# Patient Record
Sex: FEMALE | Race: WHITE | URBAN - METROPOLITAN AREA
[De-identification: names, ages, dates, MRNs, and addresses within clinical notes are randomized per-mention and may not be internally consistent; named-entity substitution may affect disease eponyms.]

---

## 2022-09-20 ENCOUNTER — LAB ENCOUNTER (OUTPATIENT)
Dept: LAB | Age: 22
End: 2022-09-20
Attending: FAMILY MEDICINE

## 2022-09-20 ENCOUNTER — OFFICE VISIT (OUTPATIENT)
Dept: FAMILY MEDICINE CLINIC | Facility: CLINIC | Age: 22
End: 2022-09-20

## 2022-09-20 VITALS
OXYGEN SATURATION: 98 % | TEMPERATURE: 98 F | DIASTOLIC BLOOD PRESSURE: 52 MMHG | RESPIRATION RATE: 16 BRPM | WEIGHT: 122 LBS | SYSTOLIC BLOOD PRESSURE: 96 MMHG | HEIGHT: 61.81 IN | HEART RATE: 84 BPM | BODY MASS INDEX: 22.45 KG/M2

## 2022-09-20 DIAGNOSIS — Z13.228 SCREENING FOR ENDOCRINE, NUTRITIONAL, METABOLIC AND IMMUNITY DISORDER: ICD-10-CM

## 2022-09-20 DIAGNOSIS — Z13.6 SCREENING FOR ISCHEMIC HEART DISEASE: ICD-10-CM

## 2022-09-20 DIAGNOSIS — Z00.00 ANNUAL PHYSICAL EXAM: Primary | ICD-10-CM

## 2022-09-20 DIAGNOSIS — Z13.29 SCREENING FOR ENDOCRINE, NUTRITIONAL, METABOLIC AND IMMUNITY DISORDER: ICD-10-CM

## 2022-09-20 DIAGNOSIS — Z13.0 SCREENING FOR ENDOCRINE, NUTRITIONAL, METABOLIC AND IMMUNITY DISORDER: ICD-10-CM

## 2022-09-20 DIAGNOSIS — L65.9 HAIR THINNING: ICD-10-CM

## 2022-09-20 DIAGNOSIS — Z12.4 CERVICAL CANCER SCREENING: ICD-10-CM

## 2022-09-20 DIAGNOSIS — Z13.21 SCREENING FOR ENDOCRINE, NUTRITIONAL, METABOLIC AND IMMUNITY DISORDER: ICD-10-CM

## 2022-09-20 PROBLEM — F12.90 MARIJUANA USE: Status: ACTIVE | Noted: 2020-02-03

## 2022-09-20 LAB
ALBUMIN SERPL-MCNC: 4 G/DL (ref 3.4–5)
ALBUMIN/GLOB SERPL: 1.3 {RATIO} (ref 1–2)
ALP LIVER SERPL-CCNC: 40 U/L
ALT SERPL-CCNC: 20 U/L
ANION GAP SERPL CALC-SCNC: 4 MMOL/L (ref 0–18)
AST SERPL-CCNC: 14 U/L (ref 15–37)
BASOPHILS # BLD AUTO: 0.04 X10(3) UL (ref 0–0.2)
BASOPHILS NFR BLD AUTO: 0.7 %
BILIRUB SERPL-MCNC: 0.4 MG/DL (ref 0.1–2)
BUN BLD-MCNC: 7 MG/DL (ref 7–18)
CALCIUM BLD-MCNC: 9 MG/DL (ref 8.5–10.1)
CHLORIDE SERPL-SCNC: 107 MMOL/L (ref 98–112)
CHOLEST SERPL-MCNC: 116 MG/DL (ref ?–200)
CO2 SERPL-SCNC: 27 MMOL/L (ref 21–32)
CREAT BLD-MCNC: 0.7 MG/DL
EOSINOPHIL # BLD AUTO: 0.11 X10(3) UL (ref 0–0.7)
EOSINOPHIL NFR BLD AUTO: 1.9 %
ERYTHROCYTE [DISTWIDTH] IN BLOOD BY AUTOMATED COUNT: 11.7 %
FASTING PATIENT LIPID ANSWER: YES
FASTING STATUS PATIENT QL REPORTED: YES
GFR SERPLBLD BASED ON 1.73 SQ M-ARVRAT: 125 ML/MIN/1.73M2 (ref 60–?)
GLOBULIN PLAS-MCNC: 3.1 G/DL (ref 2.8–4.4)
GLUCOSE BLD-MCNC: 86 MG/DL (ref 70–99)
HCT VFR BLD AUTO: 40.9 %
HDLC SERPL-MCNC: 45 MG/DL (ref 40–59)
HGB BLD-MCNC: 13.6 G/DL
IMM GRANULOCYTES # BLD AUTO: 0.01 X10(3) UL (ref 0–1)
IMM GRANULOCYTES NFR BLD: 0.2 %
LDLC SERPL CALC-MCNC: 58 MG/DL (ref ?–100)
LYMPHOCYTES # BLD AUTO: 2.05 X10(3) UL (ref 1–4)
LYMPHOCYTES NFR BLD AUTO: 35.7 %
MCH RBC QN AUTO: 28.7 PG (ref 26–34)
MCHC RBC AUTO-ENTMCNC: 33.3 G/DL (ref 31–37)
MCV RBC AUTO: 86.3 FL
MONOCYTES # BLD AUTO: 0.35 X10(3) UL (ref 0.1–1)
MONOCYTES NFR BLD AUTO: 6.1 %
NEUTROPHILS # BLD AUTO: 3.18 X10 (3) UL (ref 1.5–7.7)
NEUTROPHILS # BLD AUTO: 3.18 X10(3) UL (ref 1.5–7.7)
NEUTROPHILS NFR BLD AUTO: 55.4 %
NONHDLC SERPL-MCNC: 71 MG/DL (ref ?–130)
OSMOLALITY SERPL CALC.SUM OF ELEC: 283 MOSM/KG (ref 275–295)
PLATELET # BLD AUTO: 235 10(3)UL (ref 150–450)
POTASSIUM SERPL-SCNC: 3.9 MMOL/L (ref 3.5–5.1)
PROT SERPL-MCNC: 7.1 G/DL (ref 6.4–8.2)
RBC # BLD AUTO: 4.74 X10(6)UL
SODIUM SERPL-SCNC: 138 MMOL/L (ref 136–145)
TRIGL SERPL-MCNC: 60 MG/DL (ref 30–149)
TSI SER-ACNC: 1 MIU/ML (ref 0.36–3.74)
VLDLC SERPL CALC-MCNC: 9 MG/DL (ref 0–30)
WBC # BLD AUTO: 5.7 X10(3) UL (ref 4–11)

## 2022-09-20 PROCEDURE — 99385 PREV VISIT NEW AGE 18-39: CPT | Performed by: FAMILY MEDICINE

## 2022-09-20 PROCEDURE — 3074F SYST BP LT 130 MM HG: CPT | Performed by: FAMILY MEDICINE

## 2022-09-20 PROCEDURE — 80061 LIPID PANEL: CPT | Performed by: FAMILY MEDICINE

## 2022-09-20 PROCEDURE — 3078F DIAST BP <80 MM HG: CPT | Performed by: FAMILY MEDICINE

## 2022-09-20 PROCEDURE — 80050 GENERAL HEALTH PANEL: CPT | Performed by: FAMILY MEDICINE

## 2022-09-20 PROCEDURE — 3008F BODY MASS INDEX DOCD: CPT | Performed by: FAMILY MEDICINE

## 2022-09-23 ENCOUNTER — PATIENT MESSAGE (OUTPATIENT)
Dept: FAMILY MEDICINE CLINIC | Facility: CLINIC | Age: 22
End: 2022-09-23

## 2022-09-23 DIAGNOSIS — L65.9 HAIR THINNING: Primary | ICD-10-CM

## 2022-09-23 NOTE — TELEPHONE ENCOUNTER
From: Clayton Arnold  To: Milly Ledbetter MD  Sent: 9/23/2022 1:30 AM CDT  Subject: Derm Referral    Hi, glad to see that my bloodwork looks good. I saw that you can refer me to a derm for my hair concern and that would be great, as I have some skin concerns as well and have been meaning to go.  Thank you!!

## 2022-10-27 ENCOUNTER — LAB ENCOUNTER (OUTPATIENT)
Dept: LAB | Age: 22
End: 2022-10-27
Attending: STUDENT IN AN ORGANIZED HEALTH CARE EDUCATION/TRAINING PROGRAM
Payer: COMMERCIAL

## 2022-10-27 DIAGNOSIS — L65.9 ALOPECIA, UNSPECIFIED: Primary | ICD-10-CM

## 2022-10-27 LAB
DEPRECATED HBV CORE AB SER IA-ACNC: 21.6 NG/ML
VIT D+METAB SERPL-MCNC: 14.8 NG/ML (ref 30–100)

## 2022-10-27 PROCEDURE — 82306 VITAMIN D 25 HYDROXY: CPT

## 2022-10-27 PROCEDURE — 82728 ASSAY OF FERRITIN: CPT

## 2022-10-27 PROCEDURE — 86225 DNA ANTIBODY NATIVE: CPT

## 2022-10-27 PROCEDURE — 36415 COLL VENOUS BLD VENIPUNCTURE: CPT

## 2022-10-27 PROCEDURE — 84630 ASSAY OF ZINC: CPT

## 2022-10-27 PROCEDURE — 86038 ANTINUCLEAR ANTIBODIES: CPT

## 2022-10-29 LAB
DSDNA IGG SERPL IA-ACNC: 1 IU/ML
ENA AB SER QL IA: 0.3 UG/L
ENA AB SER QL IA: NEGATIVE

## 2022-10-30 LAB — ZINC SERUM: 70.8 UG/DL

## 2024-01-18 ENCOUNTER — OFFICE VISIT (OUTPATIENT)
Dept: FAMILY MEDICINE CLINIC | Facility: CLINIC | Age: 24
End: 2024-01-18
Payer: COMMERCIAL

## 2024-01-18 VITALS
BODY MASS INDEX: 24 KG/M2 | WEIGHT: 132.81 LBS | HEART RATE: 84 BPM | DIASTOLIC BLOOD PRESSURE: 62 MMHG | OXYGEN SATURATION: 98 % | TEMPERATURE: 97 F | SYSTOLIC BLOOD PRESSURE: 106 MMHG | RESPIRATION RATE: 18 BRPM

## 2024-01-18 DIAGNOSIS — G89.29 CHRONIC PELVIC PAIN IN FEMALE: Primary | ICD-10-CM

## 2024-01-18 DIAGNOSIS — Z23 NEED FOR VACCINATION: ICD-10-CM

## 2024-01-18 DIAGNOSIS — R10.2 CHRONIC PELVIC PAIN IN FEMALE: Primary | ICD-10-CM

## 2024-01-18 PROCEDURE — 99213 OFFICE O/P EST LOW 20 MIN: CPT | Performed by: FAMILY MEDICINE

## 2024-01-18 PROCEDURE — 3078F DIAST BP <80 MM HG: CPT | Performed by: FAMILY MEDICINE

## 2024-01-18 PROCEDURE — 3074F SYST BP LT 130 MM HG: CPT | Performed by: FAMILY MEDICINE

## 2024-01-19 NOTE — PROGRESS NOTES
CHIEF COMPLAINT:   Chief Complaint   Patient presents with    Pelvic Pain         HPI:     Alona Holt is a 23 year old female presents for pelvic pain.    Pelvic pain:  Pt has had a few months history of pelvic pain.  She first noticed it during intercourse.  She also feels the pain when she leans against a table while at work.  She has no cramping, no heavy bleeding with her periods, no painful periods.  No vaginal discharge and no urinary symptoms. She also feels the pain when weight-bearing during a BM. She pain is on both sides,.  She is not on any hormonal BC.              HISTORY:  No past medical history on file.   No past surgical history on file.   No family history on file.   Social History:   Social History     Socioeconomic History    Marital status: Single   Tobacco Use    Smoking status: Never    Smokeless tobacco: Never   Vaping Use    Vaping Use: Former    Substances: Nicotine    Devices: Disposable   Substance and Sexual Activity    Drug use: Never        Medications (Active prior to today's visit):  No current outpatient medications on file.       Allergies:  Allergies   Allergen Reactions    Fluoxetine OTHER (SEE COMMENTS)     Personality changes, started stealing      Doxycycline ITCHING, MYALGIA and PAIN     Body aches         PSFH elements reviewed from today and agreed except as otherwise stated in HPI.  ROS:     Review of Systems   Constitutional:  Negative for appetite change, chills, fatigue and fever.   Genitourinary:  Positive for menstrual problem and pelvic pain. Negative for dysuria, vaginal bleeding, vaginal discharge and vaginal pain.         Pertinent positives and negatives noted in the the HPI.    PHYSICAL EXAM:   /62 (BP Location: Left arm, Patient Position: Sitting, Cuff Size: adult)   Pulse 84   Temp 97.2 °F (36.2 °C) (Temporal)   Resp 18   Wt 132 lb 12.8 oz (60.2 kg)   LMP 01/09/2024   SpO2 98%   BMI 24.44 kg/m²   Vital signs reviewed.Appears stated  age, well groomed.  Physical Exam  Vitals reviewed.   Constitutional:       Appearance: Normal appearance.   HENT:      Head: Normocephalic.   Cardiovascular:      Rate and Rhythm: Normal rate and regular rhythm.      Pulses: Normal pulses.      Heart sounds: Normal heart sounds.   Pulmonary:      Effort: Pulmonary effort is normal. No respiratory distress.      Breath sounds: Normal breath sounds.   Abdominal:      General: Bowel sounds are normal. There is no distension.      Palpations: Abdomen is soft.      Tenderness: There is no abdominal tenderness. There is no guarding.   Genitourinary:     Comments: Deferred vaginal exam  Skin:     General: Skin is warm and dry.   Neurological:      Mental Status: She is alert.          LABS     No visits with results within 2 Month(s) from this visit.   Latest known visit with results is:   [facility] Lab Encounter on 10/27/2022   Component Date Value    Zinc 10/27/2022 70.8     Expanded MARK Antibody Sc* 10/27/2022 0.30     Anti-dsDNA antibody 10/27/2022 1.0     Connective Tissue Diseas* 10/27/2022 Negative     Ferritin 10/27/2022 21.6     Vitamin D, 25OH, Total 10/27/2022 14.8 (L)       REVIEWED THIS VISIT  ASSESSMENT/PLAN:   23 year old female with    1. Chronic pelvic pain in female  - pt is nervous about transvaginal US and prefers transabdominal US is completed instead  - recommend supportive care  - await results    - US PELVIS (TRANSABDOMINAL AND TRANSVAGINAL) (CPT=76856/98035); Future    2. Need for vaccination    - Influenza Refused      Meds This Visit:  Requested Prescriptions      No prescriptions requested or ordered in this encounter       Health Maintenance:  Health Maintenance   Topic Date Due    Pap Smear  Never done    COVID-19 Vaccine (3 - 2023-24 season) 09/01/2023    Annual Physical  09/20/2023    Influenza Vaccine (1) 01/18/2025 (Originally 10/1/2023)    DTaP,Tdap,and Td Vaccines (8 - Td or Tdap) 05/20/2032    Annual Depression Screening  Completed    HPV  Vaccines  Completed    Pneumococcal Vaccine: Birth to 64yrs  Aged Out         Patient/Caregiver Education: There are no barriers to learning. Medical education done.   Outcome: Patient verbalizes understanding and agrees with plan. Advised to call or RTC if symptoms persist or worsen.    Problem List:     Patient Active Problem List   Diagnosis    Marijuana use    VIOLA (generalized anxiety disorder)    MDD (major depressive disorder), severe (HCC)       Imaging & Referrals:  INFLUENZA REFUSED [facility]  US PELVIS (TRANSABDOMINAL AND TRANSVAGINAL) (CPT=76856/57000)     1/19/2024  Stephany Flower MD      Patient understands plan and follow-up.  Return for follow-up depending on lab results.

## 2024-01-23 ENCOUNTER — HOSPITAL ENCOUNTER (OUTPATIENT)
Dept: ULTRASOUND IMAGING | Facility: HOSPITAL | Age: 24
Discharge: HOME | End: 2024-01-23
Attending: FAMILY MEDICINE
Payer: COMMERCIAL

## 2024-01-23 DIAGNOSIS — G89.29 CHRONIC PELVIC PAIN IN FEMALE: ICD-10-CM

## 2024-01-23 DIAGNOSIS — R10.2 CHRONIC PELVIC PAIN IN FEMALE: ICD-10-CM

## 2024-01-23 PROCEDURE — 76856 US EXAM PELVIC COMPLETE: CPT | Performed by: FAMILY MEDICINE

## 2024-01-24 DIAGNOSIS — N83.201 CYST OF RIGHT OVARY: Primary | ICD-10-CM

## 2024-02-02 ENCOUNTER — PATIENT MESSAGE (OUTPATIENT)
Dept: FAMILY MEDICINE CLINIC | Facility: CLINIC | Age: 24
End: 2024-02-02

## 2024-02-02 DIAGNOSIS — G89.29 CHRONIC PELVIC PAIN IN FEMALE: Primary | ICD-10-CM

## 2024-02-02 DIAGNOSIS — R10.2 CHRONIC PELVIC PAIN IN FEMALE: Primary | ICD-10-CM

## 2024-02-12 NOTE — TELEPHONE ENCOUNTER
From: Alona Holt  To: Stephany Flower  Sent: 2/2/2024 9:33 PM CST  Subject: CT Scan    Hello,    I went to [facility] on 1/29 to find the next steps to treat my ovarian cyst because I’m not interested in hormonal birth control. I didn’t go to the place you referred because the appointments were too far in advance. I saw Dr. Mejia.  The OB I saw said that there wasn’t much she could do aside from hormonal birth control. She suggested that I request a CT scan through you because of the anatomy of the right side of the body & the pain I’m in as a precaution. She said it should work itself out with my cycle (but I’ve been experiencing worsening pain for months so I don’t understand) I feel like my pain has gotten worse in the past couple weeks as well. I feel throbbing pain as well as pain in my vagina. I’m using my heating pad & taking NSAIDs everyday and I don’t know what else to do. Do you suggest a CT scan, second opinion or anything else?    Thanks  Alona Holt

## 2024-02-12 NOTE — TELEPHONE ENCOUNTER
Please review pts MCM and advise.     A/p from ov on 1/19/24 and US from  1. Chronic pelvic pain in female  - pt is nervous about transvaginal US and prefers transabdominal US is completed instead  - recommend supportive care  - await results     - US PELVIS (TRANSABDOMINAL AND TRANSVAGINAL) (CPT=76856/54798); Future    Impression   CONCLUSION:  1. Normal size uterus.  Normal endometrium for age.  Small amount of free pelvic fluid.  Slightly complex cyst in the right ovary measuring 2.6 x 1.5 x 1.5 cm.  Normal left ovary.  No large adnexal mass.           Dictated by (CST): Jose Alfredo Russ MD on 1/23/2024 at 6:17 PM      Finalized by (CST): Jose Alfredo Russ MD on 1/23/2024 at 6:20 PM

## 2024-05-07 ENCOUNTER — TELEPHONE (OUTPATIENT)
Dept: FAMILY MEDICINE CLINIC | Facility: CLINIC | Age: 24
End: 2024-05-07

## 2024-05-07 NOTE — TELEPHONE ENCOUNTER
Called patient Lm on VM to call office and let us know if they are still interested in getting CT scan of abd/pelvis done. If not we can cancel the order. Phone number was provided

## 2024-07-26 ENCOUNTER — PATIENT MESSAGE (OUTPATIENT)
Dept: FAMILY MEDICINE CLINIC | Facility: CLINIC | Age: 24
End: 2024-07-26

## 2024-07-26 NOTE — TELEPHONE ENCOUNTER
From: Alona Holt  To: Stephany Flower  Sent: 7/26/2024 12:39 PM CDT  Subject: Adding Vision Test to Physical    Hello, can we please add a vision test to my upcoming physical on 8/1, if it isn’t provided already? Thank you

## 2024-07-29 RX ORDER — CIPROFLOXACIN AND DEXAMETHASONE 3; 1 MG/ML; MG/ML
SUSPENSION/ DROPS AURICULAR (OTIC)
COMMUNITY
Start: 2024-05-14 | End: 2024-08-01 | Stop reason: ALTCHOICE

## 2024-07-29 RX ORDER — CYCLOBENZAPRINE HCL 10 MG
TABLET ORAL
COMMUNITY
Start: 2024-05-03 | End: 2024-08-01 | Stop reason: ALTCHOICE

## 2024-07-29 RX ORDER — AMOXICILLIN AND CLAVULANATE POTASSIUM 875; 125 MG/1; MG/1
1 TABLET, FILM COATED ORAL 2 TIMES DAILY
COMMUNITY
Start: 2024-05-12 | End: 2024-08-01 | Stop reason: ALTCHOICE

## 2024-08-01 ENCOUNTER — OFFICE VISIT (OUTPATIENT)
Dept: FAMILY MEDICINE CLINIC | Facility: CLINIC | Age: 24
End: 2024-08-01
Payer: COMMERCIAL

## 2024-08-01 VITALS
OXYGEN SATURATION: 98 % | HEART RATE: 74 BPM | HEIGHT: 61.5 IN | RESPIRATION RATE: 18 BRPM | BODY MASS INDEX: 24.23 KG/M2 | TEMPERATURE: 98 F | WEIGHT: 130 LBS | SYSTOLIC BLOOD PRESSURE: 130 MMHG | DIASTOLIC BLOOD PRESSURE: 64 MMHG

## 2024-08-01 DIAGNOSIS — H53.8 BLURRY VISION: ICD-10-CM

## 2024-08-01 DIAGNOSIS — Z13.0 SCREENING FOR ENDOCRINE, NUTRITIONAL, METABOLIC AND IMMUNITY DISORDER: ICD-10-CM

## 2024-08-01 DIAGNOSIS — Z30.011 ENCOUNTER FOR INITIAL PRESCRIPTION OF CONTRACEPTIVE PILLS: ICD-10-CM

## 2024-08-01 DIAGNOSIS — Z13.228 SCREENING FOR ENDOCRINE, NUTRITIONAL, METABOLIC AND IMMUNITY DISORDER: ICD-10-CM

## 2024-08-01 DIAGNOSIS — Z13.6 SCREENING FOR ISCHEMIC HEART DISEASE: ICD-10-CM

## 2024-08-01 DIAGNOSIS — N83.201 RIGHT OVARIAN CYST: ICD-10-CM

## 2024-08-01 DIAGNOSIS — Z13.21 SCREENING FOR ENDOCRINE, NUTRITIONAL, METABOLIC AND IMMUNITY DISORDER: ICD-10-CM

## 2024-08-01 DIAGNOSIS — Z00.00 ANNUAL PHYSICAL EXAM: Primary | ICD-10-CM

## 2024-08-01 DIAGNOSIS — Z13.29 SCREENING FOR ENDOCRINE, NUTRITIONAL, METABOLIC AND IMMUNITY DISORDER: ICD-10-CM

## 2024-08-01 LAB
ALBUMIN SERPL-MCNC: 4.6 G/DL (ref 3.2–4.8)
ALBUMIN/GLOB SERPL: 1.9 {RATIO} (ref 1–2)
ALP LIVER SERPL-CCNC: 36 U/L
ALT SERPL-CCNC: 12 U/L
ANION GAP SERPL CALC-SCNC: 5 MMOL/L (ref 0–18)
AST SERPL-CCNC: 17 U/L (ref ?–34)
BASOPHILS # BLD AUTO: 0.03 X10(3) UL (ref 0–0.2)
BASOPHILS NFR BLD AUTO: 0.6 %
BILIRUB SERPL-MCNC: 0.4 MG/DL (ref 0.3–1.2)
BUN BLD-MCNC: 9 MG/DL (ref 9–23)
CALCIUM BLD-MCNC: 9.6 MG/DL (ref 8.7–10.4)
CHLORIDE SERPL-SCNC: 105 MMOL/L (ref 98–112)
CHOLEST SERPL-MCNC: 127 MG/DL (ref ?–200)
CO2 SERPL-SCNC: 28 MMOL/L (ref 21–32)
CONTROL LINE PRESENT WITH A CLEAR BACKGROUND (YES/NO): YES YES/NO
CREAT BLD-MCNC: 0.76 MG/DL
EGFRCR SERPLBLD CKD-EPI 2021: 112 ML/MIN/1.73M2 (ref 60–?)
EOSINOPHIL # BLD AUTO: 0.06 X10(3) UL (ref 0–0.7)
EOSINOPHIL NFR BLD AUTO: 1.1 %
ERYTHROCYTE [DISTWIDTH] IN BLOOD BY AUTOMATED COUNT: 12.1 %
FASTING PATIENT LIPID ANSWER: YES
FASTING STATUS PATIENT QL REPORTED: YES
GLOBULIN PLAS-MCNC: 2.4 G/DL (ref 2–3.5)
GLUCOSE BLD-MCNC: 74 MG/DL (ref 70–99)
HCT VFR BLD AUTO: 40.4 %
HDLC SERPL-MCNC: 45 MG/DL (ref 40–59)
HGB BLD-MCNC: 13.6 G/DL
IMM GRANULOCYTES # BLD AUTO: 0.01 X10(3) UL (ref 0–1)
IMM GRANULOCYTES NFR BLD: 0.2 %
KIT LOT #: NORMAL NUMERIC
LDLC SERPL CALC-MCNC: 70 MG/DL (ref ?–100)
LYMPHOCYTES # BLD AUTO: 1.94 X10(3) UL (ref 1–4)
LYMPHOCYTES NFR BLD AUTO: 36.2 %
MCH RBC QN AUTO: 29 PG (ref 26–34)
MCHC RBC AUTO-ENTMCNC: 33.7 G/DL (ref 31–37)
MCV RBC AUTO: 86.1 FL
MONOCYTES # BLD AUTO: 0.41 X10(3) UL (ref 0.1–1)
MONOCYTES NFR BLD AUTO: 7.6 %
NEUTROPHILS # BLD AUTO: 2.91 X10 (3) UL (ref 1.5–7.7)
NEUTROPHILS # BLD AUTO: 2.91 X10(3) UL (ref 1.5–7.7)
NEUTROPHILS NFR BLD AUTO: 54.3 %
NONHDLC SERPL-MCNC: 82 MG/DL (ref ?–130)
OSMOLALITY SERPL CALC.SUM OF ELEC: 283 MOSM/KG (ref 275–295)
PLATELET # BLD AUTO: 243 10(3)UL (ref 150–450)
POTASSIUM SERPL-SCNC: 3.9 MMOL/L (ref 3.5–5.1)
PREGNANCY TEST, URINE: NEGATIVE
PROT SERPL-MCNC: 7 G/DL (ref 5.7–8.2)
RBC # BLD AUTO: 4.69 X10(6)UL
SODIUM SERPL-SCNC: 138 MMOL/L (ref 136–145)
TRIGL SERPL-MCNC: 55 MG/DL (ref 30–149)
TSI SER-ACNC: 1.95 MIU/ML (ref 0.55–4.78)
VLDLC SERPL CALC-MCNC: 8 MG/DL (ref 0–30)
WBC # BLD AUTO: 5.4 X10(3) UL (ref 4–11)

## 2024-08-01 PROCEDURE — 36415 COLL VENOUS BLD VENIPUNCTURE: CPT | Performed by: FAMILY MEDICINE

## 2024-08-01 PROCEDURE — 3075F SYST BP GE 130 - 139MM HG: CPT | Performed by: FAMILY MEDICINE

## 2024-08-01 PROCEDURE — 3078F DIAST BP <80 MM HG: CPT | Performed by: FAMILY MEDICINE

## 2024-08-01 PROCEDURE — 99395 PREV VISIT EST AGE 18-39: CPT | Performed by: FAMILY MEDICINE

## 2024-08-01 PROCEDURE — 80050 GENERAL HEALTH PANEL: CPT | Performed by: FAMILY MEDICINE

## 2024-08-01 PROCEDURE — 80061 LIPID PANEL: CPT | Performed by: FAMILY MEDICINE

## 2024-08-01 PROCEDURE — 81025 URINE PREGNANCY TEST: CPT | Performed by: FAMILY MEDICINE

## 2024-08-01 PROCEDURE — 3008F BODY MASS INDEX DOCD: CPT | Performed by: FAMILY MEDICINE

## 2024-08-01 RX ORDER — NORGESTIMATE AND ETHINYL ESTRADIOL 7DAYSX3 LO
1 KIT ORAL DAILY
Qty: 3 EACH | Refills: 3 | Status: SHIPPED | OUTPATIENT
Start: 2024-08-01

## 2024-08-01 NOTE — PROGRESS NOTES
Patient came in for draw of ordered fasting labs. Patient drawn out of left  AC, x 1 attempt and tolerated well.  Light green lavender tube drawn.

## 2024-08-01 NOTE — PROGRESS NOTES
Chief Complaint   Patient presents with    Physical     Annual exam w/o pap        HPI:   Alona Holt is a 24 year old female who presents for a complete physical without gyne exam.   Patient feels well, dental visit up to date, no hearing problem.  Vaccinations: UTD    LMP: mid-July, regular  Sexual activity:monogamy   Contraception: wants to start OCPs again  Exercise: none.  Diet: watches somewhat    Wt Readings from Last 3 Encounters:   08/01/24 130 lb (59 kg)   01/18/24 132 lb 12.8 oz (60.2 kg)   09/20/22 122 lb (55.3 kg)      BP Readings from Last 3 Encounters:   08/01/24 130/64   01/18/24 106/62   09/20/22 96/52     Patient's last menstrual period was 07/15/2024 (approximate).     Annual physical:  Overall pt states she feels well.     LMP: mid-July, has been regular  Sexually Active: monogamous  Last pap smear: postponed, was referred to Gyn previously  Last mammogram: n/a  Last Colon Ca screening: n/a  Last DEXA Scan: n/a    Exercise: none  Diet: \"it's alright\"    Ovarian cyst: was having pelvic pain and noted on 1/2024 US Pelvis to have right ovarian cyst.  Pt thinks she had done better with her pelvis pain and cysts when she was birth control. She was on Depo Provera, but prefers to go back on OCPs that she was on in the past. Reviewed chart, previous PCP Rx'd Ortho Tri Cyclen Lo. She does not smoke. Has no h/o PE/DVT or blood clotting disorder.     Pt has blurry vision, is requesting eye acuity test today.        Current Outpatient Medications   Medication Sig Dispense Refill    Norgestim-Eth Estrad Triphasic (ORTHO TRI-CYCLEN LO) 0.18/0.215/0.25 MG-25 MCG Oral Tab Take 1 tablet by mouth daily. 3 each 3      Past Medical History:    Allergic rhinitis    Anxiety    Arthritis    maybe    Depression    Sleep apnea    maybe      History reviewed. No pertinent surgical history.   Family History   Problem Relation Age of Onset    Depression Mother     Psychiatric Mother     Stroke Maternal  Grandfather       Social History:  Social History     Socioeconomic History    Marital status: Single   Tobacco Use    Smoking status: Former     Current packs/day: 0.00     Types: Cigarettes     Quit date: 2022     Years since quittin.1    Smokeless tobacco: Never    Tobacco comments:     i used to vape and smoke cigareettes before   Vaping Use    Vaping status: Former    Substances: Nicotine    Devices: Disposable   Substance and Sexual Activity    Alcohol use: Never    Drug use: Never   Other Topics Concern    Caffeine Concern Yes    Exercise No    Seat Belt No    Special Diet No    Stress Concern No    Weight Concern No     Social Determinants of Health      [facility]    Housing Stability       Allergies:  Allergies   Allergen Reactions    Fluoxetine OTHER (SEE COMMENTS)     Personality changes, started stealing      Doxycycline ITCHING, MYALGIA and PAIN     Body aches          REVIEW OF SYSTEMS:     Review of Systems   Constitutional:  Negative for appetite change and fatigue.   Eyes:  Positive for visual disturbance.   Gastrointestinal:  Negative for abdominal pain, diarrhea, nausea and vomiting.   Genitourinary:  Negative for dysuria, menstrual problem and pelvic pain.        EXAM:   /64 (BP Location: Left arm, Patient Position: Sitting, Cuff Size: adult)   Pulse 74   Temp 98.1 °F (36.7 °C) (Temporal)   Resp 18   Ht 5' 1.5\" (1.562 m)   Wt 130 lb (59 kg)   LMP 07/15/2024 (Approximate)   SpO2 98%   BMI 24.17 kg/m²    GENERAL: WD/WN in no acute distress.   HEENT: PERRLA and EOMI.  OP moist no lesions.TM WNL, malka.Normal ears canals bilaterally.  Neck is supple, with no cervical LAD or thyroid abnormalities.  LUNGS: are clear to auscultation bilaterally, with no wheeze, rhonchi, or rales.   HEART: is RRR.  S1, S2, with no murmurs,clicks, gallops  BREAST:deferred  ABDOMEN: is soft,NBS, NT/ND with no HSM.  No rebound or  guarding. No CVA tenderness, no hernias.   EXAM: deferred  RECTAL EXAM: deferred  NEURO: Cranial nerves II-XII normal,no focal abnormalities, and reflexes coordination and gait normal and symmetric.Sensation intact.  EXTREMETIES: are symmetric with no cyanosis, clubbing, or edema.  MS: Normal muscles tones, no joints abnormalities.  SKIN: Normal color, turgor, no lesions, rashes or wounds.  PSYCH: normal affect and mood.    ASSESSMENT AND PLAN:     24 year old female with     1. Annual physical exam  Routine labs ordered today, await results. Counseled pt on healthy lifestyle changes. Vaccines today: UTD . Contraception: start OCPs today .     Last pap: postponed today, was referred to Gyn at previous visit    - CBC With Differential With Platelet; Future  - Comp Metabolic Panel; Future  - Lipid Panel; Future  - TSH W Reflex To Free T4; Future  - CBC With Differential With Platelet  - Comp Metabolic Panel  - Lipid Panel  - TSH W Reflex To Free T4    2. Blurry vision  - Snellen chart right eye, left eye, and both eyes all 20/20  - if having persistent sx, advised to see Optometry    3. Right ovarian cyst  - as noted on 1/2024 US Pelvis  - urine hcg: neg  - START Ortho Tri Cyclen Lo, R/B/SE of new med d/ w pt    4. Screening for ischemic heart disease    - Lipid Panel; Future  - Lipid Panel    5. Screening for endocrine, nutritional, metabolic and immunity disorder    - CBC With Differential With Platelet; Future  - Comp Metabolic Panel; Future  - TSH W Reflex To Free T4; Future  - CBC With Differential With Platelet  - Comp Metabolic Panel  - TSH W Reflex To Free T4    6. Encounter for initial prescription of contraceptive pills  - see above, for ovarian cyst management    - Urine Preg Test  - Norgestim-Eth Estrad Triphasic (ORTHO TRI-CYCLEN LO) 0.18/0.215/0.25 MG-25 MCG Oral Tab; Take 1 tablet by mouth daily.  Dispense: 3 each; Refill: 3        Pt's was recommended low fat diet and aerobic exercise 30 minutes  three times weekly.   Health maintenance.   Osteoporosis prevention addressed  Recommended whole food type diet, eliminate processed food/low sugar and low sat fat diet      The patient indicates understanding of these issues and agrees to the plan.    No follow-ups on file.

## 2024-10-21 ENCOUNTER — HOSPITAL ENCOUNTER (OUTPATIENT)
Dept: ULTRASOUND IMAGING | Facility: HOSPITAL | Age: 24
Discharge: HOME | End: 2024-10-21
Attending: FAMILY MEDICINE
Payer: COMMERCIAL

## 2024-10-21 ENCOUNTER — TELEPHONE (OUTPATIENT)
Dept: FAMILY MEDICINE CLINIC | Facility: CLINIC | Age: 24
End: 2024-10-21

## 2024-10-21 ENCOUNTER — OFFICE VISIT (OUTPATIENT)
Dept: FAMILY MEDICINE CLINIC | Facility: CLINIC | Age: 24
End: 2024-10-21
Payer: COMMERCIAL

## 2024-10-21 VITALS
SYSTOLIC BLOOD PRESSURE: 118 MMHG | RESPIRATION RATE: 16 BRPM | OXYGEN SATURATION: 98 % | BODY MASS INDEX: 24 KG/M2 | TEMPERATURE: 98 F | DIASTOLIC BLOOD PRESSURE: 70 MMHG | HEART RATE: 83 BPM | WEIGHT: 130.38 LBS

## 2024-10-21 DIAGNOSIS — M79.605 LEG PAIN, BILATERAL: Primary | ICD-10-CM

## 2024-10-21 DIAGNOSIS — M79.604 LEG PAIN, BILATERAL: Primary | ICD-10-CM

## 2024-10-21 DIAGNOSIS — Z30.011 ENCOUNTER FOR INITIAL PRESCRIPTION OF CONTRACEPTIVE PILLS: ICD-10-CM

## 2024-10-21 DIAGNOSIS — M79.604 LEG PAIN, BILATERAL: ICD-10-CM

## 2024-10-21 DIAGNOSIS — M79.605 LEG PAIN, BILATERAL: ICD-10-CM

## 2024-10-21 PROCEDURE — 3078F DIAST BP <80 MM HG: CPT | Performed by: FAMILY MEDICINE

## 2024-10-21 PROCEDURE — 99214 OFFICE O/P EST MOD 30 MIN: CPT | Performed by: FAMILY MEDICINE

## 2024-10-21 PROCEDURE — 3074F SYST BP LT 130 MM HG: CPT | Performed by: FAMILY MEDICINE

## 2024-10-21 PROCEDURE — 93970 EXTREMITY STUDY: CPT | Performed by: FAMILY MEDICINE

## 2024-10-21 RX ORDER — NORGESTIMATE AND ETHINYL ESTRADIOL 7DAYSX3 LO
1 KIT ORAL DAILY
Qty: 3 EACH | Refills: 3 | Status: SHIPPED | OUTPATIENT
Start: 2024-10-21

## 2024-10-21 NOTE — TELEPHONE ENCOUNTER
Radiology called regarding STAT imaging test results     CONCLUSION:  No evidence of DVT in the lower extremities.     Routed to provider for review

## 2024-10-23 NOTE — PROGRESS NOTES
CHIEF COMPLAINT:   Chief Complaint   Patient presents with    Follow - Up     Birth control          HPI:     Alona Holt is a 24 year old female presents for leg pain and BC f-u.    BC f-u: pt is on OCPs and since being on this for a few months, the pelvic pain has improved. She is thinking about continuing this medication.She was thinking this OCP was affecting her mood, and she doesn't want to go on Psych medication.    Leg pain: has had bilateral leg pain, but L>R.  Pain started in 2024, feels like a leg cramp in her sleep.  Sometimes is sore.  She has no dizziness, no SOB, no CP, palptiations.  She has not noticed any swelling or redness in her legs.  Only pain.              HISTORY:  Past Medical History:    Allergic rhinitis    Anxiety    Arthritis    maybe    Depression    Sleep apnea    maybe      History reviewed. No pertinent surgical history.   Family History   Problem Relation Age of Onset    Depression Mother     Psychiatric Mother     Stroke Maternal Grandfather       Social History:   Social History     Socioeconomic History    Marital status: Single   Tobacco Use    Smoking status: Former     Current packs/day: 0.00     Types: Cigarettes     Quit date: 2022     Years since quittin.3    Smokeless tobacco: Never    Tobacco comments:     i used to vape and smoke cigareettes before   Vaping Use    Vaping status: Former    Substances: Nicotine    Devices: Disposable   Substance and Sexual Activity    Alcohol use: Never    Drug use: Never   Other Topics Concern    Caffeine Concern Yes    Exercise No    Seat Belt No    Special Diet No    Stress Concern No    Weight Concern No     Social Drivers of Health      [facility]    Housing Stability        Medications (Active prior to today's visit):  Current Outpatient Medications   Medication Sig Dispense Refill    Norgestim-Eth Estrad Triphasic (ORTHO TRI-CYCLEN LO)  0.18/0.215/0.25 MG-25 MCG Oral Tab Take 1 tablet by mouth daily. 3 each 3       Allergies:  Allergies[1]    PSFH elements reviewed from today and agreed except as otherwise stated in HPI.  ROS:     Review of Systems   Constitutional:  Negative for appetite change and fatigue.   Respiratory:  Negative for chest tightness and shortness of breath.    Cardiovascular:  Negative for chest pain, palpitations and leg swelling.   Musculoskeletal:         Leg pain, bilateral   Neurological:  Negative for dizziness and light-headedness.         Pertinent positives and negatives noted in the the HPI.    PHYSICAL EXAM:   /70 (BP Location: Left arm, Patient Position: Sitting, Cuff Size: adult)   Pulse 83   Temp 98 °F (36.7 °C) (Temporal)   Resp 16   Wt 130 lb 6.4 oz (59.1 kg)   LMP 09/02/2024 (Approximate)   SpO2 98%   BMI 24.24 kg/m²   Vital signs reviewed.Appears stated age, well groomed.  Physical Exam  Vitals reviewed.   Constitutional:       Appearance: Normal appearance.   HENT:      Head: Normocephalic.   Cardiovascular:      Rate and Rhythm: Normal rate and regular rhythm.      Pulses: Normal pulses.      Heart sounds: Normal heart sounds.   Pulmonary:      Effort: Pulmonary effort is normal.      Breath sounds: Normal breath sounds.   Musculoskeletal:         General: No swelling, tenderness or deformity.      Right lower leg: No edema.      Left lower leg: No edema.      Comments: Torey sign neg and no palpable cords, bilaterally   Skin:     General: Skin is warm and dry.   Neurological:      Mental Status: She is alert and oriented to person, place, and time.   Psychiatric:         Behavior: Behavior normal.          LABS     No visits with results within 2 Month(s) from this visit.   Latest known visit with results is:   Office Visit on 08/01/2024   Component Date Value    Pregnancy Test, Urine 08/01/2024 negative     Control Line Present wit* 08/01/2024 yes     Kit Lot # 08/01/2024 [id number]     Kit  Expiration Date 08/01/2024 05/08/25     Glucose 08/01/2024 74     Sodium 08/01/2024 138     Potassium 08/01/2024 3.9     Chloride 08/01/2024 105     CO2 08/01/2024 28.0     Anion Gap 08/01/2024 5     BUN 08/01/2024 9     Creatinine 08/01/2024 0.76     Calcium, Total 08/01/2024 9.6     Calculated Osmolality 08/01/2024 283     eGFR-Cr 08/01/2024 112     AST 08/01/2024 17     ALT 08/01/2024 12     Alkaline Phosphatase 08/01/2024 36 (L)     Bilirubin, Total 08/01/2024 0.4     Total Protein 08/01/2024 7.0     Albumin 08/01/2024 4.6     Globulin  08/01/2024 2.4     A/G Ratio 08/01/2024 1.9     Patient Fasting for CMP? 08/01/2024 Yes     Cholesterol, Total 08/01/2024 127     HDL Cholesterol 08/01/2024 45     Triglycerides 08/01/2024 55     LDL Cholesterol 08/01/2024 70     VLDL 08/01/2024 8     Non HDL Chol 08/01/2024 82     Patient Fasting for Lipi* 08/01/2024 Yes     TSH 08/01/2024 1.945     WBC 08/01/2024 5.4     RBC 08/01/2024 4.69     HGB 08/01/2024 13.6     HCT 08/01/2024 40.4     PLT 08/01/2024 243.0     MCV 08/01/2024 86.1     MCH 08/01/2024 29.0     MCHC 08/01/2024 33.7     RDW 08/01/2024 12.1     Neutrophil Absolute Prel* 08/01/2024 2.91     Neutrophil Absolute 08/01/2024 2.91     Lymphocyte Absolute 08/01/2024 1.94     Monocyte Absolute 08/01/2024 0.41     Eosinophil Absolute 08/01/2024 0.06     Basophil Absolute 08/01/2024 0.03     Immature Granulocyte Abs* 08/01/2024 0.01     Neutrophil % 08/01/2024 54.3     Lymphocyte % 08/01/2024 36.2     Monocyte % 08/01/2024 7.6     Eosinophil % 08/01/2024 1.1     Basophil % 08/01/2024 0.6     Immature Granulocyte % 08/01/2024 0.2       REVIEWED THIS VISIT  ASSESSMENT/PLAN:   24 year old female with    1. Leg pain, bilateral  - r/o DVT, considering her sx and is on OCPs  - order STAT US doppler of bilateral legs, await results    - US VENOUS DOPPLER LEG BILAT - DIAG IMG (CPT=93970); Future    2. Encounter for initial prescription of contraceptive pills  - pt will cont OCPs  for now    - Norgestim-Eth Estrad Triphasic (ORTHO TRI-CYCLEN LO) 0.18/0.215/0.25 MG-25 MCG Oral Tab; Take 1 tablet by mouth daily.  Dispense: 3 each; Refill: 3      Meds This Visit:  Requested Prescriptions     Signed Prescriptions Disp Refills    Norgestim-Eth Estrad Triphasic (ORTHO TRI-CYCLEN LO) 0.18/0.215/0.25 MG-25 MCG Oral Tab 3 each 3     Sig: Take 1 tablet by mouth daily.       Health Maintenance:  Health Maintenance   Topic Date Due    Chlamydia Screening  Never done    Pap Smear  Never done    COVID-19 Vaccine (3 - 2023-24 season) 09/01/2024    Influenza Vaccine (1) 10/01/2024    Annual Physical  08/01/2025    DTaP,Tdap,and Td Vaccines (8 - Td or Tdap) 05/20/2032    Annual Depression Screening  Completed    HPV Vaccines  Completed    Pneumococcal Vaccine: Birth to 64yrs  Aged Out         Patient/Caregiver Education: There are no barriers to learning. Medical education done.   Outcome: Patient verbalizes understanding and agrees with plan. Advised to call or RTC if symptoms persist or worsen.    Problem List:     Patient Active Problem List   Diagnosis    Marijuana use    VIOLA (generalized anxiety disorder)    MDD (major depressive disorder), severe (HCC)    Right ovarian cyst       Imaging & Referrals:  None     10/23/2024  Stephany Flower MD      Patient understands plan and follow-up.  Return for follow-up depending on lab results.              [1]   Allergies  Allergen Reactions    Fluoxetine OTHER (SEE COMMENTS)     Personality changes, started stealing      Doxycycline ITCHING, MYALGIA and PAIN     Body aches        Home Suture Removal Text: Patient was provided instructions on removing sutures and will remove their sutures at home.  If they have any questions or difficulties they will call the office.